# Patient Record
Sex: MALE | Race: WHITE | URBAN - METROPOLITAN AREA
[De-identification: names, ages, dates, MRNs, and addresses within clinical notes are randomized per-mention and may not be internally consistent; named-entity substitution may affect disease eponyms.]

---

## 2018-03-08 ENCOUNTER — OFFICE VISIT (OUTPATIENT)
Dept: ORTHOPEDIC SURGERY | Age: 58
End: 2018-03-08

## 2018-03-08 VITALS
HEIGHT: 72 IN | DIASTOLIC BLOOD PRESSURE: 88 MMHG | BODY MASS INDEX: 42.66 KG/M2 | HEART RATE: 62 BPM | WEIGHT: 315 LBS | SYSTOLIC BLOOD PRESSURE: 169 MMHG

## 2018-03-08 DIAGNOSIS — M25.511 ACUTE PAIN OF RIGHT SHOULDER: ICD-10-CM

## 2018-03-08 DIAGNOSIS — S46.011A TRAUMATIC COMPLETE TEAR OF RIGHT ROTATOR CUFF, INITIAL ENCOUNTER: Primary | ICD-10-CM

## 2018-03-08 PROCEDURE — 1036F TOBACCO NON-USER: CPT | Performed by: ORTHOPAEDIC SURGERY

## 2018-03-08 PROCEDURE — G8484 FLU IMMUNIZE NO ADMIN: HCPCS | Performed by: ORTHOPAEDIC SURGERY

## 2018-03-08 PROCEDURE — 99214 OFFICE O/P EST MOD 30 MIN: CPT | Performed by: ORTHOPAEDIC SURGERY

## 2018-03-08 PROCEDURE — G8417 CALC BMI ABV UP PARAM F/U: HCPCS | Performed by: ORTHOPAEDIC SURGERY

## 2018-03-08 PROCEDURE — G8427 DOCREV CUR MEDS BY ELIG CLIN: HCPCS | Performed by: ORTHOPAEDIC SURGERY

## 2018-03-08 PROCEDURE — 3017F COLORECTAL CA SCREEN DOC REV: CPT | Performed by: ORTHOPAEDIC SURGERY

## 2018-03-08 RX ORDER — DIAZEPAM 5 MG/1
10 TABLET ORAL
Qty: 1 TABLET | Refills: 0 | Status: SHIPPED | OUTPATIENT
Start: 2018-03-08 | End: 2018-03-09

## 2018-03-08 RX ORDER — ASPIRIN 325 MG
325 TABLET ORAL DAILY
COMMUNITY

## 2018-03-08 ASSESSMENT — ENCOUNTER SYMPTOMS
RESPIRATORY NEGATIVE: 1
GASTROINTESTINAL NEGATIVE: 1
EYES NEGATIVE: 1
ALLERGIC/IMMUNOLOGIC NEGATIVE: 1
BACK PAIN: 1
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
## 2018-03-08 NOTE — LETTER
Lymphatic: The lymphatic examination bilaterally reveals all areas to be without enlargement or induration. Vascular: Examination reveals no swelling or calf tenderness. Peripheral pulses are palpable and 2+. Neurological: The patient has good coordination. There is no weakness or sensory deficit. Skin:    Head/Neck: inspection reveals no rashes, ulcerations or lesions. Trunk:  inspection reveals no rashes, ulcerations or lesions. Right Lower Extremity: inspection reveals no rashes, ulcerations or lesions. Left Lower Extremity: inspection reveals no rashes, ulcerations or lesions. Examination of the cervical spine reveals no restriction in motion. There are no reproduction of symptoms into either arm with flexion, extension, rotation or palpation. The patient has a negative Spurling sign, and no tenderness. Examination of the left shoulder reveals normal scapular control and no prominence. There is no pain over the acromioclavicular or sternoclavicular joints. The patient has no biceps pain. There is full range of motion. There is no pain with impingement testing. There is no pain with Mccray maneuver. Dalton's maneuver is normal.  There is no pain or apprehension in the abducted externally rotated position. There is no sulcus sign. There is no instability with anterior or posterior stress applied. The patient demonstrates full strength in the supraspinatus, infraspinatus, and subscapularis. Neurologic and vascular examination of the upper extremity  is normal.  Left shoulder exam reveals moderate tenderness laterally over the greater tuberosity. Active elevation is about 85°. Abduction strength is 3/5. Internal rotation strength is 4/5. External rotation strength is 3+ over 5. He has no instability. He has no a.c. joint pain.   Neurologic and vascular exams of the right upper extremity are normal.    X-rays the right shoulder from the emergency room at [facility] show

## 2018-03-15 ENCOUNTER — OFFICE VISIT (OUTPATIENT)
Dept: ORTHOPEDIC SURGERY | Age: 58
End: 2018-03-15

## 2018-03-15 VITALS
HEART RATE: 69 BPM | BODY MASS INDEX: 42.66 KG/M2 | WEIGHT: 315 LBS | HEIGHT: 72 IN | DIASTOLIC BLOOD PRESSURE: 80 MMHG | SYSTOLIC BLOOD PRESSURE: 148 MMHG

## 2018-03-15 DIAGNOSIS — M25.511 ACUTE PAIN OF RIGHT SHOULDER: ICD-10-CM

## 2018-03-15 DIAGNOSIS — S46.011A TRAUMATIC COMPLETE TEAR OF RIGHT ROTATOR CUFF, INITIAL ENCOUNTER: Primary | ICD-10-CM

## 2018-03-15 PROCEDURE — 3017F COLORECTAL CA SCREEN DOC REV: CPT | Performed by: ORTHOPAEDIC SURGERY

## 2018-03-15 PROCEDURE — 99213 OFFICE O/P EST LOW 20 MIN: CPT | Performed by: ORTHOPAEDIC SURGERY

## 2018-03-15 PROCEDURE — G8484 FLU IMMUNIZE NO ADMIN: HCPCS | Performed by: ORTHOPAEDIC SURGERY

## 2018-03-15 PROCEDURE — G8417 CALC BMI ABV UP PARAM F/U: HCPCS | Performed by: ORTHOPAEDIC SURGERY

## 2018-03-15 PROCEDURE — 1036F TOBACCO NON-USER: CPT | Performed by: ORTHOPAEDIC SURGERY

## 2018-03-15 PROCEDURE — G8428 CUR MEDS NOT DOCUMENT: HCPCS | Performed by: ORTHOPAEDIC SURGERY

## 2018-03-15 PROCEDURE — L3670 SO ACRO/CLAV CAN WEB PRE OTS: HCPCS | Performed by: ORTHOPAEDIC SURGERY

## 2018-03-15 NOTE — PROGRESS NOTES
Lawrence Damico returns today for his right shoulder. Pain at rest is 2 out of 10. With any activity at 7 out of 10. We obtained an MRI. Patient's medications, allergies, past medical, surgical, social and family histories were reviewed and updated as appropriate. Relevant review of systems reviewed. General Exam:    Vitals: Blood pressure (!) 148/80, pulse 69, height 6' (1.829 m), weight (!) 405 lb 12.8 oz (184.1 kg). Constitutional: Patient is adequately groomed with no evidence of malnutrition  Mental Status: The patient is oriented to time, place and person. The patient's mood and affect are appropriate. Right shoulder demonstrates weakness in abduction and external rotation. Internal rotation strength is 4+ over 5. His moderate biceps pain but no a.c. joint pain. He has no instability. Right shoulder MRI is reviewed. It demonstrates:    1. Large cuff tear. Complete retracted supraspinatus and infraspinatus insertional tendon    tears. Moderate complex peritendinobursitis and capsulosynovitis. Mild muscle fatty atrophy. 2. Tendinopathic subscapularis insertion. Interstitium and articular surface fibers of the    subscapularis insertion are frayed. 3. SLAP type 2A tear. 4. Mild AC joint and glenohumeral arthropathy     Assessment: Large rotator cuff tear and a 23-year-old gentleman. He is morbidly obese. Plan: We reviewed the risks, benefits, and alternatives to surgery. The alternatives include conservative management including medications, injections, and physical therapy as well as observation. Risks of surgery include but are not limited to persistent pain, instability, and reinjury. Risks also include risk of infection which could result in the need for further surgery and long-term use of antibiotics. Risks also include deep venous thromboses and pulmonary emboli.   Risks also include problems with anesthesia including but not limited to cardiovascular compromise

## 2018-03-15 NOTE — LETTER
injections, and physical therapy as well as observation. Risks of surgery include but are not limited to persistent pain, instability, and reinjury. Risks also include risk of infection which could result in the need for further surgery and long-term use of antibiotics. Risks also include deep venous thromboses and pulmonary emboli. Risks also include problems with anesthesia including but not limited to cardiovascular compromise , stroke,  and death. The patient understands that the goal of surgery is to improve pain and function but that can never be guaranteed. Surgery is indicated. Unfortunately there will be some challenges due to his size. Retear rate tear is high. His rate is probably on the order of 20 or more percent. Full recovery is greater than 6 months. The approved weight limit for the hydraulic bed is only 802 pounds. He now acknowledges the risk associated with failure of the equipment. Discussed all these issues at length. Face-to-face visit time was greater than 15 minutes. He will work on weight loss over the next several weeks. He'll need to get medical clearance as well. I'll plan on seeing him in the operating room at some point in the next month or so. This note was created using voice recognition software. It has been proofread, but occasionally errors remain. Please disregard these errors. They will be corrected as they are noted. If you have questions, please do not hesitate to call me. I look forward to following Lori Lloyd along with you.     Sincerely,    Gerardo Bridges MD

## 2018-03-20 NOTE — COMMUNICATION BODY
Dixie López returns today for his right shoulder. Pain at rest is 2 out of 10. With any activity at 7 out of 10. We obtained an MRI. Patient's medications, allergies, past medical, surgical, social and family histories were reviewed and updated as appropriate. Relevant review of systems reviewed. General Exam:  Vitals: Blood pressure (!) 148/80, pulse 69, height 6' (1.829 m), weight (!) 405 lb 12.8 oz (184.1 kg). Constitutional: Patient is adequately groomed with no evidence of malnutrition  Mental Status: The patient is oriented to time, place and person. The patient's mood and affect are appropriate. Right shoulder demonstrates weakness in abduction and external rotation. Internal rotation strength is 4+ over 5. His moderate biceps pain but no a.c. joint pain. He has no instability. Right shoulder MRI is reviewed. It demonstrates:  1. Large cuff tear. Complete retracted supraspinatus and infraspinatus insertional tendon    tears. Moderate complex peritendinobursitis and capsulosynovitis. Mild muscle fatty atrophy. 2. Tendinopathic subscapularis insertion. Interstitium and articular surface fibers of the    subscapularis insertion are frayed. 3. SLAP type 2A tear. 4. Mild AC joint and glenohumeral arthropathy     Assessment: Large rotator cuff tear and a 55-year-old gentleman. He is morbidly obese. Plan: We reviewed the risks, benefits, and alternatives to surgery. The alternatives include conservative management including medications, injections, and physical therapy as well as observation. Risks of surgery include but are not limited to persistent pain, instability, and reinjury. Risks also include risk of infection which could result in the need for further surgery and long-term use of antibiotics. Risks also include deep venous thromboses and pulmonary emboli.   Risks also include problems with anesthesia including but not limited to cardiovascular compromise , stroke,

## 2019-06-04 ENCOUNTER — TELEPHONE (OUTPATIENT)
Dept: ORTHOPEDIC SURGERY | Age: 59
End: 2019-06-04

## 2021-03-20 ENCOUNTER — IMMUNIZATION (OUTPATIENT)
Dept: PRIMARY CARE CLINIC | Age: 61
End: 2021-03-20
Payer: MEDICARE

## 2021-03-20 PROCEDURE — 0031A PR IMM ADMN SARSCOV2 AD26 5X1010VP/0.5 ML 1 DOSE: CPT | Performed by: FAMILY MEDICINE

## 2021-03-20 PROCEDURE — 91303 COVID-19, J&J VACCINE, PF, 0.5 ML DOSE: CPT | Performed by: FAMILY MEDICINE
